# Patient Record
Sex: MALE | Race: WHITE
[De-identification: names, ages, dates, MRNs, and addresses within clinical notes are randomized per-mention and may not be internally consistent; named-entity substitution may affect disease eponyms.]

---

## 2017-12-29 ENCOUNTER — HOSPITAL ENCOUNTER (EMERGENCY)
Dept: HOSPITAL 47 - EC | Age: 37
LOS: 1 days | Discharge: HOME | End: 2017-12-30
Payer: COMMERCIAL

## 2017-12-29 DIAGNOSIS — R55: Primary | ICD-10-CM

## 2017-12-29 DIAGNOSIS — I10: ICD-10-CM

## 2017-12-29 DIAGNOSIS — R07.89: ICD-10-CM

## 2017-12-29 DIAGNOSIS — R05: ICD-10-CM

## 2017-12-29 DIAGNOSIS — Z79.899: ICD-10-CM

## 2017-12-29 DIAGNOSIS — F17.200: ICD-10-CM

## 2017-12-29 PROCEDURE — 83735 ASSAY OF MAGNESIUM: CPT

## 2017-12-29 PROCEDURE — 82553 CREATINE MB FRACTION: CPT

## 2017-12-29 PROCEDURE — 85025 COMPLETE CBC W/AUTO DIFF WBC: CPT

## 2017-12-29 PROCEDURE — 99285 EMERGENCY DEPT VISIT HI MDM: CPT

## 2017-12-29 PROCEDURE — 80053 COMPREHEN METABOLIC PANEL: CPT

## 2017-12-29 PROCEDURE — 36415 COLL VENOUS BLD VENIPUNCTURE: CPT

## 2017-12-29 PROCEDURE — 84484 ASSAY OF TROPONIN QUANT: CPT

## 2017-12-29 PROCEDURE — 71020: CPT

## 2017-12-29 PROCEDURE — 93005 ELECTROCARDIOGRAM TRACING: CPT

## 2017-12-29 NOTE — ED
General Adult HPI





- General


Chief complaint: Chest Pain


Stated complaint: SOB/Cough


Time Seen by Provider: 12/29/17 23:08


Source: patient


Mode of arrival: wheelchair


Limitations: no limitations





- History of Present Illness


Initial comments: 





This is a 37-year-old male with a history of hypertension who presents emergent 

department for syncopal episode.  The patient states that he was recently 

diagnosed with tracheobronchitis and completed a course of antibiotics.  He has 

had a consistent and chronic cough ever since his diagnosis.  He states he was 

playing cards tonight and suddenly coughed forcefully.  He then felt 

lightheaded and passed out.  His significant other was at bedside and witnessed 

the episode.  Apparently only lasted a few seconds before he came to.  The 

patient states that he has a mild headache at this time however denies any 

shortness of breath or lightheadedness currently.  He denies any nausea or 

vomiting.  He states that he does have some chest discomfort however it's been 

chronic for the last month.  He did have his heart evaluated approximately one 

year ago and was told it was fine.  The patient doesn't history of high blood 

pressure however has not been taking his losartan for the last couple of days 

because he needed a refill.  He just got it refilled today.  He denies any 

other acute complaints at this time.





- Related Data


 Home Medications











 Medication  Instructions  Recorded  Confirmed


 


Albuterol Inhaler [Ventolin Hfa 3 puff INHALATION RT-Q4H PRN 12/29/17 12/29/17





Inhaler]   


 


Albuterol Nebulized [Ventolin 2.5 mg INHALATION RT-QID PRN 12/29/17 12/29/17





Nebulized]   


 


HYDROcodone/APAP 10-325MG [Norco 1 tab PO Q4HR PRN 12/29/17 12/29/17





]   


 


Losartan Potassium [Cozaar] 25 mg PO DAILY 12/29/17 12/29/17











 Allergies











Allergy/AdvReac Type Severity Reaction Status Date / Time


 


No Known Allergies Allergy   Verified 12/29/17 23:08














Review of Systems


ROS Statement: 


Those systems with pertinent positive or pertinent negative responses have been 

documented in the HPI.





ROS Other: All systems not noted in ROS Statement are negative.





Past Medical History


Past Medical History: Asthma, Hypertension


Additional Past Medical History / Comment(s): chronic back pain


History of Any Multi-Drug Resistant Organisms: None Reported


Past Surgical History: Orthopedic Surgery


Past Psychological History: No Psychological Hx Reported


Smoking Status: Current every day smoker


Past Alcohol Use History: Occasional


Past Drug Use History: None Reported





General Exam





- General Exam Comments


Initial Comments: 





Constitutional: Awake alert Appears comfortable


Head: Normocephalic atraumatic 


Eyes: no conjunctival injection No scleral icterus EOMI


Neck: No JVD Supple


Heart: Regular rate rhythm normal S1-S2 no murmurs


Lungs: Clear to auscultation bilaterally No wheezing No rales


Abdomen: Soft nondistended nontender


Extremities: Non edematous DP pulses intact Radial pulses intact


Neuro: A&Ox3 No focal neurologic deficits


Psych: Appropriate mood and affect





Limitations: no limitations





Course


 Vital Signs











  12/29/17





  23:00


 


Temperature 98.6 F


 


Pulse Rate 101 H


 


Respiratory 18





Rate 


 


Blood Pressure 151/83


 


O2 Sat by Pulse 96





Oximetry 














EKG Findings





- EKG Comments:


EKG Findings:: EKG showing normal sinus rhythm with a rate of 94.  No abnormal 

ST segment changes or T-wave inversion.  QTC is 437.  Other intervals normal.  

No ectopy.





Medical Decision Making





- Medical Decision Making





This is a 37-year-old male who presents emergency department for a syncopal 

episode.  Patient was monitored for over an hour no arrhythmias were found on 

the monitor.  The patient felt improved after some fluids.  Troponin negative.  

EKG unremarkable.  The patient has no significant risk factors for cardiac 

arrhythmia.  This time I feel the patient can go home.  He needs close follow-

up with primary doctor.  Told to return if he has any worsening or changing 

symptoms.  All questions were answered.





- Lab Data


Result diagrams: 


 12/30/17 00:13





 12/30/17 00:13


 Lab Results











  12/30/17 12/30/17 12/30/17 Range/Units





  00:13 00:13 00:13 


 


WBC  10.7 H    (3.8-10.6)  k/uL


 


RBC  4.91    (4.30-5.90)  m/uL


 


Hgb  14.8    (13.0-17.5)  gm/dL


 


Hct  44.7    (39.0-53.0)  %


 


MCV  91.1    (80.0-100.0)  fL


 


MCH  30.2    (25.0-35.0)  pg


 


MCHC  33.1    (31.0-37.0)  g/dL


 


RDW  14.1    (11.5-15.5)  %


 


Plt Count  206    (150-450)  k/uL


 


Neutrophils %  56    %


 


Lymphocytes %  33    %


 


Monocytes %  5    %


 


Eosinophils %  2    %


 


Basophils %  1    %


 


Neutrophils #  5.9    (1.3-7.7)  k/uL


 


Lymphocytes #  3.5    (1.0-4.8)  k/uL


 


Monocytes #  0.6    (0-1.0)  k/uL


 


Eosinophils #  0.2    (0-0.7)  k/uL


 


Basophils #  0.1    (0-0.2)  k/uL


 


Sodium   140   (137-145)  mmol/L


 


Potassium   4.5   (3.5-5.1)  mmol/L


 


Chloride   104   ()  mmol/L


 


Carbon Dioxide   25   (22-30)  mmol/L


 


Anion Gap   11   mmol/L


 


BUN   19   (9-20)  mg/dL


 


Creatinine   0.90   (0.66-1.25)  mg/dL


 


Est GFR (MDRD) Af Amer   >60   (>60 ml/min/1.73 sqM)  


 


Est GFR (MDRD) Non-Af   >60   (>60 ml/min/1.73 sqM)  


 


Glucose   119 H   (74-99)  mg/dL


 


Calcium   9.6   (8.4-10.2)  mg/dL


 


Magnesium   2.0   (1.6-2.3)  mg/dL


 


Total Bilirubin   0.6   (0.2-1.3)  mg/dL


 


AST   48   (17-59)  U/L


 


ALT   72   (21-72)  U/L


 


Alkaline Phosphatase   61   ()  U/L


 


CK-MB (CK-2)    3.4 H*  (0.0-2.4)  ng/mL


 


Troponin I    <0.012  (0.000-0.034)  ng/mL


 


Total Protein   7.2   (6.3-8.2)  g/dL


 


Albumin   4.3   (3.5-5.0)  g/dL














Disposition


Clinical Impression: 


 Syncope





Disposition: HOME SELF-CARE


Condition: Stable


Instructions:  Syncope (ED)


Referrals: 


Evens Donaldson MD [Primary Care Provider] - 1-2 days

## 2017-12-30 VITALS — DIASTOLIC BLOOD PRESSURE: 91 MMHG | TEMPERATURE: 97.9 F | HEART RATE: 90 BPM | SYSTOLIC BLOOD PRESSURE: 174 MMHG

## 2017-12-30 VITALS — RESPIRATION RATE: 20 BRPM

## 2017-12-30 LAB
ALBUMIN SERPL-MCNC: 4.3 G/DL (ref 3.5–5)
ALP SERPL-CCNC: 61 U/L (ref 38–126)
ALT SERPL-CCNC: 72 U/L (ref 21–72)
ANION GAP SERPL CALC-SCNC: 11 MMOL/L
AST SERPL-CCNC: 48 U/L (ref 17–59)
BASOPHILS # BLD AUTO: 0.1 K/UL (ref 0–0.2)
BASOPHILS NFR BLD AUTO: 1 %
BUN SERPL-SCNC: 19 MG/DL (ref 9–20)
CALCIUM SPEC-MCNC: 9.6 MG/DL (ref 8.4–10.2)
CHLORIDE SERPL-SCNC: 104 MMOL/L (ref 98–107)
CO2 SERPL-SCNC: 25 MMOL/L (ref 22–30)
EOSINOPHIL # BLD AUTO: 0.2 K/UL (ref 0–0.7)
EOSINOPHIL NFR BLD AUTO: 2 %
ERYTHROCYTE [DISTWIDTH] IN BLOOD BY AUTOMATED COUNT: 4.91 M/UL (ref 4.3–5.9)
ERYTHROCYTE [DISTWIDTH] IN BLOOD: 14.1 % (ref 11.5–15.5)
GLUCOSE SERPL-MCNC: 119 MG/DL (ref 74–99)
HCT VFR BLD AUTO: 44.7 % (ref 39–53)
HGB BLD-MCNC: 14.8 GM/DL (ref 13–17.5)
LYMPHOCYTES # SPEC AUTO: 3.5 K/UL (ref 1–4.8)
LYMPHOCYTES NFR SPEC AUTO: 33 %
MAGNESIUM SPEC-SCNC: 2 MG/DL (ref 1.6–2.3)
MCH RBC QN AUTO: 30.2 PG (ref 25–35)
MCHC RBC AUTO-ENTMCNC: 33.1 G/DL (ref 31–37)
MCV RBC AUTO: 91.1 FL (ref 80–100)
MONOCYTES # BLD AUTO: 0.6 K/UL (ref 0–1)
MONOCYTES NFR BLD AUTO: 5 %
NEUTROPHILS # BLD AUTO: 5.9 K/UL (ref 1.3–7.7)
NEUTROPHILS NFR BLD AUTO: 56 %
PLATELET # BLD AUTO: 206 K/UL (ref 150–450)
POTASSIUM SERPL-SCNC: 4.5 MMOL/L (ref 3.5–5.1)
PROT SERPL-MCNC: 7.2 G/DL (ref 6.3–8.2)
SODIUM SERPL-SCNC: 140 MMOL/L (ref 137–145)
TROPONIN I SERPL-MCNC: <0.012 NG/ML (ref 0–0.03)
WBC # BLD AUTO: 10.7 K/UL (ref 3.8–10.6)

## 2017-12-30 NOTE — XR
EXAMINATION TYPE: XR chest 2V

 

DATE OF EXAM: 12/30/2017

 

COMPARISON: NONE

 

HISTORY: Cough

 

TECHNIQUE:  Frontal and lateral views of the chest are obtained.

 

FINDINGS:  Heart and mediastinum are normal. Lungs are clear. Diaphragm is normal. There are chest le
ads. Bony thorax is intact.

 

IMPRESSION:  Normal chest

## 2018-10-24 ENCOUNTER — HOSPITAL ENCOUNTER (OUTPATIENT)
Dept: HOSPITAL 47 - NEUROMAIN | Age: 38
Discharge: HOME | End: 2018-10-24
Attending: FAMILY MEDICINE
Payer: COMMERCIAL

## 2018-10-24 DIAGNOSIS — I10: ICD-10-CM

## 2018-10-24 DIAGNOSIS — G93.89: Primary | ICD-10-CM

## 2018-10-24 DIAGNOSIS — R55: ICD-10-CM

## 2018-10-24 PROCEDURE — 95819 EEG AWAKE AND ASLEEP: CPT

## 2018-10-24 PROCEDURE — 93880 EXTRACRANIAL BILAT STUDY: CPT

## 2018-10-24 PROCEDURE — 70460 CT HEAD/BRAIN W/DYE: CPT

## 2018-10-24 NOTE — US
EXAMINATION TYPE: US carotid duplex BILAT

 

DATE OF EXAM: 10/24/2018

 

COMPARISON: NONE

 

CLINICAL HISTORY: R55 Syncope, I10 Hypertension.

 

EXAM MEASUREMENTS: 

 

RIGHT:  Peak Systolic Velocity (PSV) cm/sec

----- Right CCA:  110.7  

----- Right ICA:  90.8     

----- Right ECA:  83.1   

ICA/CCA ratio:  0.8    

 

RIGHT:  End Diastole cm/sec

----- Right CCA:  28.3   

----- Right ICA:  39.0      

----- Right ECA:  14.2     

 

LEFT:  Peak Systolic Velocity (PSV) cm/sec

----- Left CCA:  81.8

----- Left ICA:  74.4   

----- Left ECA:  93.6  

ICA/CCA ratio:  0.9  

 

LEFT:  End Diastole cm/sec

----- Left CCA:  23.4  

----- Left ICA:  36.5   

----- Left ECA: 20.4

 

VERTEBRALS (direction of flow):

Right Vertebral: Antegrade

Left Vertebral: Antegrade

 

Rhythm:  Normal

 

Patient has thick neck, and pulsatile vessels, technically difficult study. 

No evident plaque, no significant velocity elevations. 

 

 

 

IMPRESSION:  

1. No diagnostic evidence of significant hemodynamic stenosis as visualized.   

 

 

Criteria for Assigning % of Stenosis / Diameter reduction

(Estimation based on the indirect measurements of the internal carotid artery velocities (ICA PSV).

1.  Normal (no stenosis)=ICA PSV < 125 cm/s: ratio < 2.0: ICA EDV<40 cm/s.

2. Less than 50% stenosis=ICA PSV < 125 cm/s: ratio < 2.0: ICA EDV<40 cm/s.

3.  50 to 69% stenosis=ICA PSV of 125 to 230 cm/s: ration 2.0 ? 4.0: ICA EDV  cm/s.

4.  Greater than 70% stenosis to near occlusion= ICA PSV > 230 cm/s: ratio > 4.0: ICA EDV > 100 cm/s.
 

5.  Near occlusion= ICA PSV velocities may be low or undetectable: variable ratio and ICA EDV.

6.  Total occlusion=unable to detect flow.

## 2018-10-24 NOTE — CT
EXAMINATION TYPE: CT brain w con

 

DATE OF EXAM: 10/24/2018

 

COMPARISON: None

 

HISTORY: Syncope

 

CT DLP: 1239 mGycm  Automated Exposure Control for Dose Reduction was Utilized.

 

TECHNIQUE: CT scan of the head is performed with IV contrast.,CT scan of the head is performed withou
t and with with IV Contrast, patient injected with 100 mL of Isovue 300.

 

 

FINDINGS: Ventricular system is midline. Cerebellar tonsils are slightly low-lying in position.

 

Intraorbital structures are intact. No midline shift. No mass effect. No enhancing masses postcontras
t administration. Mild focal prominence the basilar tip. Recommend MRA to assess for small aneurysm.

 

 IMPRESSION:

1. Low-lying cerebellar tonsils. MRI follow-up recommended to assess for Chiari malformation.

2. There is focal prominence the tip of the basilar artery. Recommend MRA [place] [facility] [place].

3. Findings compatible with chronic sinusitis

## 2018-10-24 NOTE — EEG
ELECTROENCEPHALOGRAM REPORT



DATE OF SERVICE:

10/24/2018.



REASON FOR TESTING:

Syncope.



DESCRIPTION OF THE PROCEDURE:

This EEG was performed using a 21 channel digital electroencephalograph, following

international 10-20 system.



DESCRIPTION OF THE RECORDING:

From the beginning of the tracing, and with patient's eyes closed, the background

rhythm was mostly consisting of 8-9 Hz alpha frequency in the posterior occipital

leads.  No obvious asymmetry is seen.  Occasional movement and muscle artifacts are

seen.  Photic stimulation was performed with no driving response seen.  No pathological

waves were elicited.  Hyperventilation was not performed.  The patient does reach stage

II of sleep during the tracing and occasional sleep spindles are seen.  No epileptiform

discharges were seen.  His EKG lead showed a regular rate and rhythm.



INTERPRETATION:

This asleep and awake EEG can be considered within normal limits.  There was no

asymmetry seen.  No epileptiform discharges were noticed.  The absence of epileptiform

discharges does not rule out the diagnosis of epilepsy; therefore clinical correlation

is recommended.



Thank you, Dr. Donaldson for allowing me to participate in the care of your patient.  If

you have any questions, please feel free to contact me.





MMODL / IJN: [id number] / Job#: [id number]

## 2018-12-06 ENCOUNTER — HOSPITAL ENCOUNTER (OUTPATIENT)
Dept: HOSPITAL 47 - RADMRIMAIN | Age: 38
End: 2018-12-06
Attending: FAMILY MEDICINE
Payer: COMMERCIAL

## 2018-12-06 DIAGNOSIS — Q07.00: Primary | ICD-10-CM

## 2018-12-06 PROCEDURE — 70544 MR ANGIOGRAPHY HEAD W/O DYE: CPT

## 2018-12-06 PROCEDURE — 70553 MRI BRAIN STEM W/O & W/DYE: CPT

## 2018-12-07 NOTE — MR
EXAMINATION TYPE: MR brain wo/w con

 

DATE OF EXAM: 12/6/2018

 

COMPARISON: None

 

HISTORY: Arnold-Chiari syndrome.

 

TECHNIQUE: 

Multiplanar, multisequence images of the brain and brainstem is performed without and with IV contras
t, utilizing 15 mL intravenous . Gadolinium

 

FINDINGS: 

 

Ventricles of normal size. There is no mass effect nor midline shift. There is no sign of intracrania
l hemorrhage. There is no evidence of cortical infarct. Corpus callosum appears normal. Sella turcica
 is within normal limits. There is mucosal thickening in the maxillary ethmoid frontal sinuses. Also 
sphenoid sinus.

 

Gray-white matter structures have fairly normal signal pattern. There is no evidence of cortical infa
rct. Brainstem is intact. The cerebellum is in normal position. I see no evidence of Chiari malformat
ion. Fourth ventricle appears normal. There is normal contrast opacification of the venous sinuses. I
 see no pathologic intracranial enhancement.

 

IMPRESSION: Negative MR scan of the brain. Pansinusitis noted.

## 2018-12-07 NOTE — MR
EXAMINATION TYPE: MR angio head wo con

 

DATE OF EXAM: 12/6/2018

 

COMPARISON: None

 

HISTORY: Arnold-Chiari malformation

 

TECHNIQUE: Time of flight images focusing on the [place] were performed without contrast.

 

FINDINGS: There is arterial flow in the distal internal carotid arteries. There is arterial flow in t
he vertebrobasilar artery system. Right vertebral artery is larger than the left.

 

There is arterial flow in the anterior middle and posterior cerebral arteries. The posterior communic
ating arteries are diminutive. I see no evidence of aneurysm or neovascularity. There is no mass effe
ct. There is no evidence of spasm or stenosis.

 

IMPRESSION: 

Normal MR angiogram of the brain.

## 2019-08-15 ENCOUNTER — HOSPITAL ENCOUNTER (OUTPATIENT)
Dept: HOSPITAL 47 - ORWHC2ENDO | Age: 39
Discharge: HOME | End: 2019-08-15
Attending: SURGERY
Payer: COMMERCIAL

## 2019-08-15 VITALS — HEART RATE: 82 BPM | SYSTOLIC BLOOD PRESSURE: 128 MMHG | RESPIRATION RATE: 18 BRPM | DIASTOLIC BLOOD PRESSURE: 67 MMHG

## 2019-08-15 VITALS — TEMPERATURE: 96.8 F

## 2019-08-15 VITALS — BODY MASS INDEX: 49.5 KG/M2

## 2019-08-15 DIAGNOSIS — Z79.891: ICD-10-CM

## 2019-08-15 DIAGNOSIS — Z79.899: ICD-10-CM

## 2019-08-15 DIAGNOSIS — K64.8: ICD-10-CM

## 2019-08-15 DIAGNOSIS — Z83.79: ICD-10-CM

## 2019-08-15 DIAGNOSIS — Z80.0: ICD-10-CM

## 2019-08-15 DIAGNOSIS — Z96.659: ICD-10-CM

## 2019-08-15 DIAGNOSIS — J44.9: ICD-10-CM

## 2019-08-15 DIAGNOSIS — Z88.5: ICD-10-CM

## 2019-08-15 DIAGNOSIS — Z79.82: ICD-10-CM

## 2019-08-15 DIAGNOSIS — Z83.3: ICD-10-CM

## 2019-08-15 DIAGNOSIS — Z12.11: Primary | ICD-10-CM

## 2019-08-15 DIAGNOSIS — Z82.49: ICD-10-CM

## 2019-08-15 DIAGNOSIS — F17.200: ICD-10-CM

## 2019-08-15 DIAGNOSIS — E66.01: ICD-10-CM

## 2019-08-15 DIAGNOSIS — M19.90: ICD-10-CM

## 2019-08-15 DIAGNOSIS — I10: ICD-10-CM

## 2019-08-15 NOTE — P.OP
Date of Procedure: 08/15/19


Preoperative Diagnosis: 


Screening


Family history of colon cancer


Postoperative Diagnosis: 


Normal colon


Internal hemorrhoids


Procedure(s) Performed: 


Colonoscopy


Surgeon: Kasey Ni


Pathology: none sent


Condition: stable


Disposition: same day


Indications for Procedure: 


39-year-old male presents for a screening colonoscopy.  His brother was recently

diagnosed with colon cancer at age 42.  Secondary to this, the patient is 

planned for screening colonoscopy.  He denied any blood in his stool.  He was 

explained risks, benefits and alternatives.  He did provide consent prior to 

attending the endoscopy suite.


Operative Findings: 


Normal colon


Internal hemorrhoids


Description of Procedure: 


The patient was brought into the endoscopy suite.  He was then placed in left 

lateral decubitus position and adequate sedation was achieved using conscious 

sedation.  A digital rectal exam was performed and mild internal hemorrhoids 

were palpated.  An endoscope was then placed in the rectum and advanced to the 

cecum as identified by landmarks including the appendiceal orifice and the 

ileocecal valve.  The prep was fair.  The colonoscope was then slowly withdrawn,

examining for any mucosal abnormalities.  The cecum, ascending, transverse, 

descending and sigmoid colon were visualized adequately.  There were no large 

neoplastic lesions noted throughout the colon.  There was no evidence of polyps 

throughout the colon.  There was no evidence of significant diverticulosis of 

the colon.  Retroflexion was performed in the rectum and mild internal 

hemorrhoids were visible.  Excess air was removed.  The colonoscope withdrawn 

and the procedure terminated.  The patient was then transferred to the recovery 

unit in stable condition.  Repeat colonoscopy should be performed in 5 years due

to family history of colon cancer.